# Patient Record
Sex: MALE | Race: WHITE | NOT HISPANIC OR LATINO | Employment: UNEMPLOYED | URBAN - METROPOLITAN AREA
[De-identification: names, ages, dates, MRNs, and addresses within clinical notes are randomized per-mention and may not be internally consistent; named-entity substitution may affect disease eponyms.]

---

## 2019-06-26 ENCOUNTER — APPOINTMENT (EMERGENCY)
Dept: RADIOLOGY | Facility: HOSPITAL | Age: 59
End: 2019-06-26
Payer: COMMERCIAL

## 2019-06-26 ENCOUNTER — HOSPITAL ENCOUNTER (EMERGENCY)
Facility: HOSPITAL | Age: 59
Discharge: HOME | End: 2019-06-26
Attending: EMERGENCY MEDICINE | Admitting: EMERGENCY MEDICINE
Payer: COMMERCIAL

## 2019-06-26 VITALS
TEMPERATURE: 97.6 F | DIASTOLIC BLOOD PRESSURE: 85 MMHG | OXYGEN SATURATION: 97 % | RESPIRATION RATE: 18 BRPM | SYSTOLIC BLOOD PRESSURE: 152 MMHG | HEART RATE: 71 BPM

## 2019-06-26 DIAGNOSIS — M54.9 BACK PAIN: Primary | ICD-10-CM

## 2019-06-26 PROCEDURE — 72070 X-RAY EXAM THORAC SPINE 2VWS: CPT

## 2019-06-26 PROCEDURE — 99283 EMERGENCY DEPT VISIT LOW MDM: CPT

## 2019-06-26 PROCEDURE — 99284 EMERGENCY DEPT VISIT MOD MDM: CPT | Performed by: PHYSICIAN ASSISTANT

## 2019-06-26 PROCEDURE — 96372 THER/PROPH/DIAG INJ SC/IM: CPT

## 2019-06-26 PROCEDURE — 72100 X-RAY EXAM L-S SPINE 2/3 VWS: CPT

## 2019-06-26 RX ORDER — KETOROLAC TROMETHAMINE 30 MG/ML
15 INJECTION, SOLUTION INTRAMUSCULAR; INTRAVENOUS ONCE
Status: COMPLETED | OUTPATIENT
Start: 2019-06-26 | End: 2019-06-26

## 2019-06-26 RX ADMIN — KETOROLAC TROMETHAMINE 15 MG: 30 INJECTION, SOLUTION INTRAMUSCULAR; INTRAVENOUS at 19:33

## 2019-06-26 NOTE — ED NOTES
Wife of patient extremely upset regarding that getting anything more for pain; ED provider made aware that we do not treat chronic pain and that no narcotics will be prescribed due to following with pain management; wife of patient came to nurses station with phone of doctor from pain management; ED provider on the phone with pain management physician at this time      Shasha Hodge RN  06/26/19 9883

## 2019-06-26 NOTE — ED PROVIDER NOTES
History  Chief Complaint   Patient presents with    Back Pain     pt reports hx of back problems, pt reports he threw it out a week ago, was seen at pain managment today to get injection but insurance wouldnt cover it, able to ambulate with cane, taking vicodin with no relief     68-year-old male with past medical history of chronic back pain Parkinson's disease with a deep brain stimulator presenting for evaluation of back pain  Patient states approximately 2 weeks ago he tripped and fell over a lawnmower exacerbating L lower back pain  Patient states since that time he has been using ice heat and his Vicodin at with only minimal relief  Patient does follow with pain management in even saw them today  He was supposed to have a injection today however his insurance did not cover it and he came to the ED for evaluation for continued pain  I explained to patient that we can only offer him Toradol or other anti-inflammatories as he is already following with pain management and we do not could treat chronic pain with narcotic medications in the ED  Patient denies any radiation of the pain  No numbness tingling or weakness of the lower extremities  No saddle anesthesia no dysuria or hematuria  None       Past Medical History:   Diagnosis Date    Chronic back pain     Parkinson disease ([address] )        Past Surgical History:   Procedure Laterality Date    APPENDECTOMY      CHOLECYSTECTOMY      DEEP BRAIN STIMULATOR PLACEMENT      SPINE SURGERY         History reviewed  No pertinent family history  I have reviewed and agree with the history as documented  Social History     Tobacco Use    Smoking status: Never Smoker   Substance Use Topics    Alcohol use: Yes     Comment: socially    Drug use: Never        Review of Systems   All other systems reviewed and are negative  Physical Exam  Physical Exam   Constitutional: He is oriented to person, place, and time   He appears well-developed and well-nourished  No distress  HENT:   Head: Normocephalic and atraumatic  Eyes: Conjunctivae are normal    EOM grossly intact   Neck: Normal range of motion  Neck supple  No JVD present  Cardiovascular: Normal rate  Pulmonary/Chest: Effort normal    Abdominal: Soft  Musculoskeletal:        Arms:  FROM LE b/l, steady gait, ambulating with a can which is normal for pt, cap refill brisk, strength and sensation  intact throughout LE b/l, dp and pt pulses intact LLE b/l   Neurological: He is alert and oriented to person, place, and time  Skin: Skin is warm and dry  Capillary refill takes less than 2 seconds  Psychiatric: He has a normal mood and affect  His behavior is normal    Nursing note and vitals reviewed  Vital Signs  ED Triage Vitals [06/26/19 1912]   Temperature Pulse Respirations Blood Pressure SpO2   97 6 °F (36 4 °C) 71 18 152/85 97 %      Temp Source Heart Rate Source Patient Position - Orthostatic VS BP Location FiO2 (%)   Oral Monitor Sitting Left arm --      Pain Score       8           Vitals:    06/26/19 1912   BP: 152/85   Pulse: 71   Patient Position - Orthostatic VS: Sitting         Visual Acuity      ED Medications  Medications   ketorolac (TORADOL) injection 15 mg (15 mg Intramuscular Given 6/26/19 1933)       Diagnostic Studies  Results Reviewed     None                 XR spine thoracic 2 views   Final Result by Lizz Pisano MD (06/26 2003)   Probable mild chronic superior endplate wedge compression deformities T8-T12  Multilevel degenerative spondylosis   Osteopenia         Workstation performed: [id number]         XR spine lumbar 2 or 3 views injury   Final Result by Lizz Pisano MD (06/26 2000)   No acute lumbar spinal traumatic injury confirmed      Multilevel surgical changes, vertebral plasties         Workstation performed: [id number]                    Procedures  Procedures       ED Course  ED Course as of Jun 26 2044 Wed Jun 26, 2019   1930 Wife storms out of the room and gives me the phone, on the phone is their pain management doctor, dr pizarro, I explained the story he agrees with plan of no narcotics, will move forward with my plan of toradol IM and xray      1934 Wife of pt states 'what if we quit pain management right now can you treat him then?', she is telling me that I am refusing care, I advised her that we are giving him toradol IM for pain and I am not refusing care, she is asking questions about insurance issues and I advised him to call insurance company, wife is stating that pain management is only open from 8-4 'so what am I supposed to do they already refused to take care of him' I again advised that the injection was not covered by insurance, care was not refused                                  MDM  Number of Diagnoses or Management Options  Back pain:   Diagnosis management comments: 77-year-old male presenting for evaluation of acute exacerbation of chronic back pain, I offered patient Toradol IM and x-rays of the thoracic and lumbar spine were negative showing no acute osseous abnormality, I spoke with patient at length regarding treatment of chronic back pain in the ED, I even spoke to patient's pain management doctor and explained to both the physician and patient why we cannot treat with additional narcotics that were not given by pain management, I offered antispasmodic medication however patient reports he has both Flexeril and Robaxin at home, he has no red flag symptoms he is distally neurovascularly intact, f/u with pcp and pain management tomorrow    All imaging discussed with patient, strict return to ED precautions discussed  Pt verbalizes understanding and agrees with plan  Pt is stable for discharge    Portions of the record may have been created with voice recognition software  Occasional wrong word or "sound a like" substitutions may have occurred due to the inherent limitations of voice recognition software   Read the chart carefully and recognize, using context, where substitutions have occurred  Disposition  Final diagnoses:   Back pain     Time reflects when diagnosis was documented in both MDM as applicable and the Disposition within this note     Time User Action Codes Description Comment    6/26/2019  8:09 PM [place] Add [M54 9] Back pain       ED Disposition     ED Disposition Condition Date/Time Comment    Discharge Stable Wed Jun 26, 2019  8:08 PM [facility] [address] discharge to home/self care  Follow-up Information     Follow up With Specialties Details Why Contact Info    Madeline Seth,  Family Medicine Call in 1 day  [address]      [facility] [address] [place] [address] Physician  Call in 1 day            There are no discharge medications for this patient  No discharge procedures on file      ED Provider  Electronically Signed by           Guzman Reddy PA-C  06/26/19 7265

## 2019-11-08 ENCOUNTER — APPOINTMENT (OUTPATIENT)
Dept: RADIOLOGY | Facility: MEDICAL CENTER | Age: 59
End: 2019-11-08
Payer: COMMERCIAL

## 2019-11-08 ENCOUNTER — OFFICE VISIT (OUTPATIENT)
Dept: URGENT CARE | Facility: MEDICAL CENTER | Age: 59
End: 2019-11-08
Payer: COMMERCIAL

## 2019-11-08 VITALS
TEMPERATURE: 97.4 F | SYSTOLIC BLOOD PRESSURE: 120 MMHG | DIASTOLIC BLOOD PRESSURE: 78 MMHG | BODY MASS INDEX: 31.15 KG/M2 | OXYGEN SATURATION: 97 % | WEIGHT: 230 LBS | HEART RATE: 68 BPM | RESPIRATION RATE: 16 BRPM | HEIGHT: 72 IN

## 2019-11-08 DIAGNOSIS — R06.02 SHORTNESS OF BREATH: Primary | ICD-10-CM

## 2019-11-08 DIAGNOSIS — R06.02 SHORTNESS OF BREATH: ICD-10-CM

## 2019-11-08 PROCEDURE — 71046 X-RAY EXAM CHEST 2 VIEWS: CPT

## 2019-11-08 PROCEDURE — 93005 ELECTROCARDIOGRAM TRACING: CPT | Performed by: PHYSICIAN ASSISTANT

## 2019-11-08 PROCEDURE — 99213 OFFICE O/P EST LOW 20 MIN: CPT | Performed by: PHYSICIAN ASSISTANT

## 2019-11-08 RX ORDER — GABAPENTIN 300 MG/1
CAPSULE ORAL
COMMUNITY
Start: 2019-01-31

## 2019-11-08 RX ORDER — BUDESONIDE 0.5 MG/2ML
0.5 INHALANT ORAL 2 TIMES DAILY
COMMUNITY
Start: 2019-02-06 | End: 2020-02-06

## 2019-11-08 RX ORDER — PRAMIPEXOLE 3 MG/1
TABLET, EXTENDED RELEASE ORAL
COMMUNITY
Start: 2019-07-08

## 2019-11-08 RX ORDER — ROSUVASTATIN CALCIUM 10 MG/1
TABLET, COATED ORAL
COMMUNITY
Start: 2019-08-26

## 2019-11-08 RX ORDER — BUDESONIDE AND FORMOTEROL FUMARATE DIHYDRATE 80; 4.5 UG/1; UG/1
2 AEROSOL RESPIRATORY (INHALATION) 2 TIMES DAILY
COMMUNITY
Start: 2018-12-07 | End: 2019-12-07

## 2019-11-08 RX ORDER — ATORVASTATIN CALCIUM 10 MG/1
TABLET, FILM COATED ORAL
COMMUNITY

## 2019-11-08 RX ORDER — CARBIDOPA/LEVODOPA 25MG-250MG
TABLET ORAL
COMMUNITY

## 2019-11-08 RX ORDER — CITALOPRAM 10 MG/1
30 TABLET ORAL DAILY
COMMUNITY
Start: 2019-02-07 | End: 2020-02-07

## 2019-11-08 RX ORDER — FENOFIBRATE 160 MG/1
TABLET ORAL
COMMUNITY
Start: 2019-10-14

## 2019-11-08 RX ORDER — RIVAROXABAN 10 MG/1
TABLET, FILM COATED ORAL
COMMUNITY
Start: 2019-09-01

## 2019-11-08 RX ORDER — TAMSULOSIN HYDROCHLORIDE 0.4 MG/1
0.4 CAPSULE ORAL 3 TIMES DAILY PRN
COMMUNITY

## 2019-11-08 NOTE — PROGRESS NOTES
[facility] [facility]        NAME: Vickey Nelson is a 61 y o  male  : 1960    MRN: [id number]  DATE: 2019  TIME: 9:32 PM    Assessment and Plan   Shortness of breath [R06 02]  1  Shortness of breath  XR chest pa & lateral    ECG 12 lead    CANCELED: XR chest pa & lateral         Patient Instructions     Patient transferred to ED for further evaluation     Chief Complaint     Chief Complaint   Patient presents with    Shortness of Breath     Pt c/o of SOB x a few weeks  Treated at PCP with prednisone, which he completed about 2 weeks ago  SOB at rest and OCAMPO  History of Present Illness       Patient 80-year-old male presenting the office for shortness of breath  Patient states that he has been having shortness of breath for approximately 3-4 weeks in duration  Patient states that he has seen by his primary care doctor  Patient states that he had chest x-ray done, EKG, blood work which all came back normal   Patient states that he was treated with prednisone  Patient states that his symptoms had some reduction with the administration of prednisone  Patient states that he has been having increased wheezing association with shortness of breath  Patient states that he woke up couple nights with chills  Patient denies fevers or chest pain  Patient denies any numbness tingling in his arms or his legs  Patient states that when he lays down he has difficulty breathing  Patient states that any increase in activity, he has difficulty breathing  Patient states that he has increasing leg swelling, more than usual   Patient denies any nausea vomiting diarrhea  Shortness of Breath   This is a new problem  The current episode started 1 to 4 weeks ago  The problem occurs constantly  The problem has been gradually worsening  Associated symptoms include orthopnea, sputum production and wheezing   Pertinent negatives include no abdominal pain, chest pain, claudication, coryza, ear pain, fever, headaches, hemoptysis, leg pain, leg swelling, neck pain, PND, rash, rhinorrhea, sore throat, swollen glands, syncope or vomiting  The symptoms are aggravated by any activity and lying flat  He has tried oral steroids for the symptoms  The treatment provided mild relief  His past medical history is significant for PE  There is no history of allergies, aspirin allergies, asthma, bronchiolitis, CAD, chronic lung disease, COPD, DVT, a heart failure, pneumonia or a recent surgery  Review of Systems   Review of Systems   Constitutional: Negative  Negative for fever  HENT: Negative  Negative for ear pain, rhinorrhea and sore throat  Eyes: Negative  Respiratory: Positive for sputum production, chest tightness, shortness of breath and wheezing  Negative for apnea, cough, hemoptysis, choking and stridor  Cardiovascular: Positive for orthopnea  Negative for chest pain, claudication, leg swelling, syncope and PND  Gastrointestinal: Negative  Negative for abdominal distention, abdominal pain and vomiting  Endocrine: Negative  Genitourinary: Negative  Musculoskeletal: Negative  Negative for neck pain  Skin: Negative  Negative for rash  Allergic/Immunologic: Negative  Neurological: Negative  Negative for headaches  Hematological: Negative  Psychiatric/Behavioral: Negative            Current Medications       Current Outpatient Medications:     budesonide (PULMICORT) 0 5 mg/2 mL nebulizer solution, Inhale 0 5 mg 2 (two) times a day, Disp: , Rfl:     budesonide-formoterol (SYMBICORT) 80-4 5 MCG/ACT inhaler, Inhale 2 puffs 2 (two) times a day, Disp: , Rfl:     citalopram (CeleXA) 10 mg tablet, Take 30 mg by mouth daily, Disp: , Rfl:     fenofibrate (TRIGLIDE) 160 MG tablet, take 1 tablet by mouth every evening, Disp: , Rfl:     gabapentin (NEURONTIN) 300 mg capsule, gabapentin 300 mg capsule, Disp: , Rfl:     Pramipexole Dihydrochloride ER 3 MG TB24, TAKE 2 TABLETS DAILY, Disp: , Rfl:     rosuvastatin (CRESTOR) 10 MG tablet, TAKE 1 TABLET EVERY MORNING, Disp: , Rfl:     atorvastatin (LIPITOR) 10 mg tablet, atorvastatin 10 mg tablet, Disp: , Rfl:     carbidopa-levodopa (SINEMET)  mg per tablet, carbidopa 25 mg-levodopa 250 mg tablet, Disp: , Rfl:     CARBIDOPA-LEVODOPA EN, carbidopa-levodopa, Disp: , Rfl:     Omega-3-acid Ethyl Esters (LOVAZA PO), Omega 3, Disp: , Rfl:     tamsulosin (FLOMAX) 0 4 mg, Take 0 4 mg by mouth Three times daily as needed, Disp: , Rfl:     XARELTO 10 MG tablet, , Disp: , Rfl:     Current Allergies     Allergies as of 11/08/2019 - Reviewed 11/08/2019   Allergen Reaction Noted    Atorvastatin Myalgia 08/12/2016    Ezetimibe  06/26/2019    Heparin  06/26/2019    Morphine  06/26/2019    Steri strip [medical tape]  06/26/2019            The following portions of the patient's history were reviewed and updated as appropriate: allergies, current medications, past family history, past medical history, past social history, past surgical history and problem list      Past Medical History:   Diagnosis Date    Chronic back pain     Parkinson disease ([facility] [address] )        Past Surgical History:   Procedure Laterality Date    APPENDECTOMY      CHOLECYSTECTOMY      DEEP BRAIN STIMULATOR PLACEMENT      SPINE SURGERY         No family history on file  Medications have been verified  Objective   /78   Pulse 68   Temp (!) 97 4 °F (36 3 °C)   Resp 16   Ht 6' (1 829 m)   Wt 104 kg (230 lb)   SpO2 97%   BMI 31 19 kg/m²        Physical Exam     Physical Exam   Constitutional: He is oriented to person, place, and time  He appears well-developed and well-nourished  HENT:   Head: Normocephalic  Eyes: Pupils are equal, round, and reactive to light  Conjunctivae and EOM are normal    Neck: Normal range of motion  Cardiovascular: Normal rate, regular rhythm, normal heart sounds and intact distal pulses     Pulmonary/Chest: Effort normal  He has no decreased breath sounds  He has wheezes in the right upper field, the right middle field, the right lower field, the left upper field, the left middle field and the left lower field  He has no rhonchi  He has no rales  Abdominal: Soft  Neurological: He is alert and oriented to person, place, and time  Skin: Skin is warm  Capillary refill takes less than 2 seconds  Psychiatric: He has a normal mood and affect  His behavior is normal  Judgment and thought content normal    Nursing note and vitals reviewed  12 Lead EKG:  Normal sinus rhythm        XR chest pa & lateral   Status: Final result   PACS Images      Show images for XR chest pa & lateral   Study Result     CHEST      INDICATION:   R06 02: Shortness of breath      COMPARISON:  None     EXAM PERFORMED/VIEWS:  XR CHEST PA & LATERAL        FINDINGS:  dbs device identified  Cardiomediastinal silhouette appears unremarkable      The lungs are clear    No pneumothorax or pleural effusion      Osseous structures appear within normal limits for patient age      IMPRESSION:     No acute cardiopulmonary disease

## 2019-11-10 LAB
ATRIAL RATE: 65 BPM
P AXIS: 18 DEGREES
PR INTERVAL: 132 MS
QRS AXIS: 6 DEGREES
QRSD INTERVAL: 86 MS
QT INTERVAL: 394 MS
QTC INTERVAL: 409 MS
T WAVE AXIS: -14 DEGREES
VENTRICULAR RATE: 65 BPM

## 2019-11-10 PROCEDURE — 93010 ELECTROCARDIOGRAM REPORT: CPT
